# Patient Record
Sex: MALE | Race: WHITE | NOT HISPANIC OR LATINO | URBAN - METROPOLITAN AREA
[De-identification: names, ages, dates, MRNs, and addresses within clinical notes are randomized per-mention and may not be internally consistent; named-entity substitution may affect disease eponyms.]

---

## 2017-01-31 ENCOUNTER — COMMUNICATION - HEALTHEAST (OUTPATIENT)
Dept: CARDIOLOGY | Facility: CLINIC | Age: 77
End: 2017-01-31

## 2017-01-31 DIAGNOSIS — E78.5 HYPERLIPEMIA: ICD-10-CM

## 2017-02-07 ENCOUNTER — RECORDS - HEALTHEAST (OUTPATIENT)
Dept: LAB | Facility: CLINIC | Age: 77
End: 2017-02-07

## 2017-02-07 LAB
CHOLEST SERPL-MCNC: 159 MG/DL
FASTING STATUS PATIENT QL REPORTED: ABNORMAL
HDLC SERPL-MCNC: 55 MG/DL
LDLC SERPL CALC-MCNC: 73 MG/DL
PSA SERPL-MCNC: 3.8 NG/ML (ref 0–6.5)
TRIGL SERPL-MCNC: 154 MG/DL

## 2017-07-17 ENCOUNTER — COMMUNICATION - HEALTHEAST (OUTPATIENT)
Dept: CARDIOLOGY | Facility: CLINIC | Age: 77
End: 2017-07-17

## 2017-07-18 ENCOUNTER — AMBULATORY - HEALTHEAST (OUTPATIENT)
Dept: CARDIOLOGY | Facility: CLINIC | Age: 77
End: 2017-07-18

## 2017-07-18 DIAGNOSIS — I25.10 CAD (CORONARY ARTERY DISEASE): ICD-10-CM

## 2017-07-18 DIAGNOSIS — I10 HTN (HYPERTENSION): ICD-10-CM

## 2017-08-02 ENCOUNTER — COMMUNICATION - HEALTHEAST (OUTPATIENT)
Dept: TELEHEALTH | Facility: CLINIC | Age: 77
End: 2017-08-02

## 2017-08-02 ENCOUNTER — HOSPITAL ENCOUNTER (OUTPATIENT)
Dept: CARDIOLOGY | Facility: CLINIC | Age: 77
Discharge: HOME | End: 2017-08-02
Attending: INTERNAL MEDICINE

## 2017-08-02 ENCOUNTER — HOSPITAL ENCOUNTER (OUTPATIENT)
Dept: NUCLEAR MEDICINE | Facility: CLINIC | Age: 77
Discharge: HOME | End: 2017-08-02
Attending: INTERNAL MEDICINE

## 2017-08-02 DIAGNOSIS — I25.10 CAD (CORONARY ARTERY DISEASE): ICD-10-CM

## 2017-08-02 DIAGNOSIS — I10 HTN (HYPERTENSION): ICD-10-CM

## 2017-08-02 LAB
CV STRESS CURRENT BP HE: NORMAL
CV STRESS CURRENT HR HE: 62
CV STRESS CURRENT HR HE: 65
CV STRESS CURRENT HR HE: 73
CV STRESS CURRENT HR HE: 79
CV STRESS CURRENT HR HE: 81
CV STRESS CURRENT HR HE: 81
CV STRESS CURRENT HR HE: 82
CV STRESS CURRENT HR HE: 83
CV STRESS CURRENT HR HE: 84
CV STRESS CURRENT HR HE: 85
CV STRESS CURRENT HR HE: 86
CV STRESS CURRENT HR HE: 92
CV STRESS CURRENT HR HE: 92
CV STRESS CURRENT HR HE: 94
CV STRESS CURRENT HR HE: 94
CV STRESS CURRENT HR HE: 96
CV STRESS DEVIATION TIME HE: NORMAL
CV STRESS ECHO PERCENT HR HE: NORMAL
CV STRESS EXERCISE STAGE HE: NORMAL
CV STRESS FINAL RESTING BP HE: NORMAL
CV STRESS FINAL RESTING HR HE: 81
CV STRESS MAX HR HE: 100
CV STRESS MAX TREADMILL GRADE HE: 0
CV STRESS MAX TREADMILL SPEED HE: 0
CV STRESS PEAK DIA BP HE: NORMAL
CV STRESS PEAK SYS BP HE: NORMAL
CV STRESS PHASE HE: NORMAL
CV STRESS PROTOCOL HE: NORMAL
CV STRESS RESTING PT POSITION HE: NORMAL
CV STRESS ST DEVIATION AMOUNT HE: NORMAL
CV STRESS ST DEVIATION ELEVATION HE: NORMAL
CV STRESS ST EVELATION AMOUNT HE: NORMAL
CV STRESS TEST TYPE HE: NORMAL
CV STRESS TOTAL STAGE TIME MIN 1 HE: NORMAL
NUC STRESS EJECTION FRACTION: 74 %
STRESS ECHO BASELINE BP: NORMAL
STRESS ECHO BASELINE HR: 63
STRESS ECHO CALCULATED PERCENT HR: 70 %
STRESS ECHO LAST STRESS BP: NORMAL
STRESS ECHO LAST STRESS HR: 92

## 2017-08-08 ENCOUNTER — RECORDS - HEALTHEAST (OUTPATIENT)
Dept: LAB | Facility: CLINIC | Age: 77
End: 2017-08-08

## 2017-08-08 LAB
CREAT SERPL-MCNC: 1.18 MG/DL (ref 0.7–1.3)
GFR SERPL CREATININE-BSD FRML MDRD: 60 ML/MIN/1.73M2

## 2017-08-11 ASSESSMENT — MIFFLIN-ST. JEOR: SCORE: 1536.33

## 2017-08-16 ENCOUNTER — SURGERY - HEALTHEAST (OUTPATIENT)
Dept: SURGERY | Facility: CLINIC | Age: 77
End: 2017-08-16

## 2017-08-16 ENCOUNTER — ANESTHESIA - HEALTHEAST (OUTPATIENT)
Dept: SURGERY | Facility: CLINIC | Age: 77
End: 2017-08-16

## 2017-08-16 ASSESSMENT — MIFFLIN-ST. JEOR
SCORE: 1539.51

## 2017-08-17 ENCOUNTER — HOME CARE/HOSPICE - HEALTHEAST (OUTPATIENT)
Dept: HOME HEALTH SERVICES | Facility: HOME HEALTH | Age: 77
End: 2017-08-17

## 2017-08-20 ENCOUNTER — HOME CARE/HOSPICE - HEALTHEAST (OUTPATIENT)
Dept: HOME HEALTH SERVICES | Facility: HOME HEALTH | Age: 77
End: 2017-08-20

## 2017-08-21 ENCOUNTER — HOME CARE/HOSPICE - HEALTHEAST (OUTPATIENT)
Dept: HOME HEALTH SERVICES | Facility: HOME HEALTH | Age: 77
End: 2017-08-21

## 2017-08-22 ENCOUNTER — HOME CARE/HOSPICE - HEALTHEAST (OUTPATIENT)
Dept: HOME HEALTH SERVICES | Facility: HOME HEALTH | Age: 77
End: 2017-08-22

## 2017-08-23 ENCOUNTER — HOME CARE/HOSPICE - HEALTHEAST (OUTPATIENT)
Dept: HOME HEALTH SERVICES | Facility: HOME HEALTH | Age: 77
End: 2017-08-23

## 2017-08-25 ENCOUNTER — HOME CARE/HOSPICE - HEALTHEAST (OUTPATIENT)
Dept: HOME HEALTH SERVICES | Facility: HOME HEALTH | Age: 77
End: 2017-08-25

## 2017-08-28 ENCOUNTER — HOME CARE/HOSPICE - HEALTHEAST (OUTPATIENT)
Dept: HOME HEALTH SERVICES | Facility: HOME HEALTH | Age: 77
End: 2017-08-28

## 2017-08-30 ENCOUNTER — HOME CARE/HOSPICE - HEALTHEAST (OUTPATIENT)
Dept: HOME HEALTH SERVICES | Facility: HOME HEALTH | Age: 77
End: 2017-08-30

## 2017-08-31 ENCOUNTER — HOME CARE/HOSPICE - HEALTHEAST (OUTPATIENT)
Dept: HOME HEALTH SERVICES | Facility: HOME HEALTH | Age: 77
End: 2017-08-31

## 2017-09-01 ENCOUNTER — HOME CARE/HOSPICE - HEALTHEAST (OUTPATIENT)
Dept: HOME HEALTH SERVICES | Facility: HOME HEALTH | Age: 77
End: 2017-09-01

## 2017-12-27 ENCOUNTER — RECORDS - HEALTHEAST (OUTPATIENT)
Dept: LAB | Facility: CLINIC | Age: 77
End: 2017-12-27

## 2017-12-27 LAB
CREAT SERPL-MCNC: 0.9 MG/DL (ref 0.7–1.3)
GFR SERPL CREATININE-BSD FRML MDRD: >60 ML/MIN/1.73M2

## 2018-02-09 ENCOUNTER — RECORDS - HEALTHEAST (OUTPATIENT)
Dept: LAB | Facility: CLINIC | Age: 78
End: 2018-02-09

## 2018-02-09 LAB
ANION GAP SERPL CALCULATED.3IONS-SCNC: 12 MMOL/L (ref 5–18)
BUN SERPL-MCNC: 16 MG/DL (ref 8–28)
CALCIUM SERPL-MCNC: 9.2 MG/DL (ref 8.5–10.5)
CHLORIDE BLD-SCNC: 100 MMOL/L (ref 98–107)
CO2 SERPL-SCNC: 25 MMOL/L (ref 22–31)
CREAT SERPL-MCNC: 1.01 MG/DL (ref 0.7–1.3)
GFR SERPL CREATININE-BSD FRML MDRD: >60 ML/MIN/1.73M2
GLUCOSE BLD-MCNC: 188 MG/DL (ref 70–125)
POTASSIUM BLD-SCNC: 3.7 MMOL/L (ref 3.5–5)
SODIUM SERPL-SCNC: 137 MMOL/L (ref 136–145)

## 2018-02-10 LAB — BACTERIA SPEC CULT: NO GROWTH

## 2018-02-15 ASSESSMENT — MIFFLIN-ST. JEOR: SCORE: 1536.33

## 2018-02-16 ENCOUNTER — RECORDS - HEALTHEAST (OUTPATIENT)
Dept: LAB | Facility: CLINIC | Age: 78
End: 2018-02-16

## 2018-02-16 LAB
CREAT SERPL-MCNC: 1.11 MG/DL (ref 0.7–1.3)
GFR SERPL CREATININE-BSD FRML MDRD: >60 ML/MIN/1.73M2
POTASSIUM BLD-SCNC: 4.6 MMOL/L (ref 3.5–5)

## 2018-02-20 ENCOUNTER — ANESTHESIA - HEALTHEAST (OUTPATIENT)
Dept: SURGERY | Facility: CLINIC | Age: 78
End: 2018-02-20

## 2018-02-21 ENCOUNTER — SURGERY - HEALTHEAST (OUTPATIENT)
Dept: SURGERY | Facility: CLINIC | Age: 78
End: 2018-02-21

## 2018-02-21 ENCOUNTER — HOME CARE/HOSPICE - HEALTHEAST (OUTPATIENT)
Dept: HOME HEALTH SERVICES | Facility: HOME HEALTH | Age: 78
End: 2018-02-21

## 2018-02-21 ASSESSMENT — MIFFLIN-ST. JEOR: SCORE: 1504.58

## 2018-02-25 ENCOUNTER — HOME CARE/HOSPICE - HEALTHEAST (OUTPATIENT)
Dept: HOME HEALTH SERVICES | Facility: HOME HEALTH | Age: 78
End: 2018-02-25

## 2018-02-27 ENCOUNTER — HOME CARE/HOSPICE - HEALTHEAST (OUTPATIENT)
Dept: HOME HEALTH SERVICES | Facility: HOME HEALTH | Age: 78
End: 2018-02-27

## 2018-02-28 ENCOUNTER — HOME CARE/HOSPICE - HEALTHEAST (OUTPATIENT)
Dept: HOME HEALTH SERVICES | Facility: HOME HEALTH | Age: 78
End: 2018-02-28

## 2018-03-02 ENCOUNTER — COMMUNICATION - HEALTHEAST (OUTPATIENT)
Dept: CARDIOLOGY | Facility: CLINIC | Age: 78
End: 2018-03-02

## 2018-03-02 ENCOUNTER — HOME CARE/HOSPICE - HEALTHEAST (OUTPATIENT)
Dept: HOME HEALTH SERVICES | Facility: HOME HEALTH | Age: 78
End: 2018-03-02

## 2018-03-02 DIAGNOSIS — E78.5 HYPERLIPEMIA: ICD-10-CM

## 2018-03-06 ENCOUNTER — HOME CARE/HOSPICE - HEALTHEAST (OUTPATIENT)
Dept: HOME HEALTH SERVICES | Facility: HOME HEALTH | Age: 78
End: 2018-03-06

## 2018-03-07 ENCOUNTER — HOME CARE/HOSPICE - HEALTHEAST (OUTPATIENT)
Dept: HOME HEALTH SERVICES | Facility: HOME HEALTH | Age: 78
End: 2018-03-07

## 2018-03-09 ENCOUNTER — HOME CARE/HOSPICE - HEALTHEAST (OUTPATIENT)
Dept: HOME HEALTH SERVICES | Facility: HOME HEALTH | Age: 78
End: 2018-03-09

## 2018-04-07 ENCOUNTER — COMMUNICATION - HEALTHEAST (OUTPATIENT)
Dept: CARDIOLOGY | Facility: CLINIC | Age: 78
End: 2018-04-07

## 2018-04-07 DIAGNOSIS — E78.5 HYPERLIPEMIA: ICD-10-CM

## 2018-04-19 ENCOUNTER — RECORDS - HEALTHEAST (OUTPATIENT)
Dept: ADMINISTRATIVE | Facility: OTHER | Age: 78
End: 2018-04-19

## 2018-04-19 ENCOUNTER — AMBULATORY - HEALTHEAST (OUTPATIENT)
Dept: CARDIOLOGY | Facility: CLINIC | Age: 78
End: 2018-04-19

## 2018-04-20 ENCOUNTER — OFFICE VISIT - HEALTHEAST (OUTPATIENT)
Dept: CARDIOLOGY | Facility: CLINIC | Age: 78
End: 2018-04-20

## 2018-04-20 DIAGNOSIS — I25.83 CORONARY ARTERY DISEASE DUE TO LIPID RICH PLAQUE: ICD-10-CM

## 2018-04-20 DIAGNOSIS — I25.10 CORONARY ARTERY DISEASE DUE TO LIPID RICH PLAQUE: ICD-10-CM

## 2018-04-20 DIAGNOSIS — I10 ESSENTIAL HYPERTENSION: ICD-10-CM

## 2018-04-20 DIAGNOSIS — E78.49 OTHER HYPERLIPIDEMIA: ICD-10-CM

## 2018-04-20 ASSESSMENT — MIFFLIN-ST. JEOR: SCORE: 1522.73

## 2018-05-07 ENCOUNTER — HOSPITAL ENCOUNTER (OUTPATIENT)
Dept: CARDIOLOGY | Facility: CLINIC | Age: 78
Discharge: HOME | End: 2018-05-07
Attending: INTERNAL MEDICINE

## 2018-05-07 ENCOUNTER — HOSPITAL ENCOUNTER (OUTPATIENT)
Dept: NUCLEAR MEDICINE | Facility: CLINIC | Age: 78
Discharge: HOME | End: 2018-05-07
Attending: INTERNAL MEDICINE

## 2018-05-07 DIAGNOSIS — I25.10 CORONARY ARTERY DISEASE DUE TO LIPID RICH PLAQUE: ICD-10-CM

## 2018-05-07 DIAGNOSIS — I25.83 CORONARY ARTERY DISEASE DUE TO LIPID RICH PLAQUE: ICD-10-CM

## 2018-05-07 LAB
CV STRESS CURRENT BP HE: NORMAL
CV STRESS CURRENT HR HE: 100
CV STRESS CURRENT HR HE: 102
CV STRESS CURRENT HR HE: 103
CV STRESS CURRENT HR HE: 103
CV STRESS CURRENT HR HE: 105
CV STRESS CURRENT HR HE: 109
CV STRESS CURRENT HR HE: 110
CV STRESS CURRENT HR HE: 112
CV STRESS CURRENT HR HE: 113
CV STRESS CURRENT HR HE: 113
CV STRESS CURRENT HR HE: 114
CV STRESS CURRENT HR HE: 115
CV STRESS CURRENT HR HE: 115
CV STRESS CURRENT HR HE: 122
CV STRESS CURRENT HR HE: 124
CV STRESS CURRENT HR HE: 125
CV STRESS CURRENT HR HE: 128
CV STRESS CURRENT HR HE: 130
CV STRESS CURRENT HR HE: 130
CV STRESS CURRENT HR HE: 132
CV STRESS CURRENT HR HE: 134
CV STRESS CURRENT HR HE: 91
CV STRESS CURRENT HR HE: 94
CV STRESS CURRENT HR HE: 97
CV STRESS CURRENT HR HE: 97
CV STRESS CURRENT HR HE: 98
CV STRESS CURRENT HR HE: 99
CV STRESS DEVIATION TIME HE: NORMAL
CV STRESS ECHO PERCENT HR HE: NORMAL
CV STRESS EXERCISE STAGE HE: NORMAL
CV STRESS EXERCISE STAGE REACHED HE: NORMAL
CV STRESS FINAL RESTING BP HE: NORMAL
CV STRESS FINAL RESTING HR HE: 94
CV STRESS MAX HR HE: 139
CV STRESS MAX TREADMILL GRADE HE: 14
CV STRESS MAX TREADMILL SPEED HE: 3.4
CV STRESS PEAK DIA BP HE: NORMAL
CV STRESS PEAK SYS BP HE: NORMAL
CV STRESS PHASE HE: NORMAL
CV STRESS PROTOCOL HE: NORMAL
CV STRESS RESTING PT POSITION HE: NORMAL
CV STRESS RESTING PT POSITION HE: NORMAL
CV STRESS ST DEVIATION AMOUNT HE: NORMAL
CV STRESS ST DEVIATION ELEVATION HE: NORMAL
CV STRESS ST EVELATION AMOUNT HE: NORMAL
CV STRESS TEST TYPE HE: NORMAL
CV STRESS TOTAL STAGE TIME MIN 1 HE: NORMAL
NUC STRESS EJECTION FRACTION: NORMAL %
STRESS ECHO BASELINE BP: NORMAL
STRESS ECHO BASELINE HR: 99
STRESS ECHO CALCULATED PERCENT HR: 99 %
STRESS ECHO LAST STRESS BP: NORMAL
STRESS ECHO LAST STRESS HR: 130
STRESS ECHO POST ESTIMATED WORKLOAD: 8.7
STRESS ECHO POST EXERCISE DUR MIN: 7
STRESS ECHO POST EXERCISE DUR SEC: 12
STRESS ECHO TARGET HR: 120

## 2018-05-08 ENCOUNTER — COMMUNICATION - HEALTHEAST (OUTPATIENT)
Dept: CARDIOLOGY | Facility: CLINIC | Age: 78
End: 2018-05-08

## 2018-05-08 DIAGNOSIS — E78.5 HYPERLIPEMIA: ICD-10-CM

## 2018-05-15 ENCOUNTER — COMMUNICATION - HEALTHEAST (OUTPATIENT)
Dept: CARDIOLOGY | Facility: CLINIC | Age: 78
End: 2018-05-15

## 2018-07-02 ENCOUNTER — RECORDS - HEALTHEAST (OUTPATIENT)
Dept: LAB | Facility: CLINIC | Age: 78
End: 2018-07-02

## 2018-07-02 LAB
C REACTIVE PROTEIN LHE: 0.1 MG/DL (ref 0–0.8)
CREAT SERPL-MCNC: 0.95 MG/DL (ref 0.7–1.3)
GFR SERPL CREATININE-BSD FRML MDRD: >60 ML/MIN/1.73M2
POTASSIUM BLD-SCNC: 3.8 MMOL/L (ref 3.5–5)

## 2018-07-03 LAB — B BURGDOR IGG+IGM SER QL: 0.19 INDEX VALUE

## 2018-07-05 LAB
A PHAGOCYTOPH DNA BLD QL NAA+PROBE: NOT DETECTED
E CHAFFEENSIS DNA BLD QL NAA+PROBE: NOT DETECTED
E EWINGII DNA SPEC QL NAA+PROBE: NOT DETECTED
EHRLICHIA DNA SPEC QL NAA+PROBE: NOT DETECTED

## 2018-10-25 ENCOUNTER — HOME CARE/HOSPICE - HEALTHEAST (OUTPATIENT)
Dept: HOME HEALTH SERVICES | Facility: HOME HEALTH | Age: 78
End: 2018-10-25

## 2018-10-25 ENCOUNTER — ANESTHESIA - HEALTHEAST (OUTPATIENT)
Dept: INTENSIVE CARE | Facility: CLINIC | Age: 78
End: 2018-10-25

## 2018-10-31 ENCOUNTER — AMBULATORY - HEALTHEAST (OUTPATIENT)
Dept: INTENSIVE CARE | Facility: CLINIC | Age: 78
End: 2018-10-31

## 2018-11-02 ENCOUNTER — HOME CARE/HOSPICE - HEALTHEAST (OUTPATIENT)
Dept: HOME HEALTH SERVICES | Facility: HOME HEALTH | Age: 78
End: 2018-11-02

## 2018-11-04 ENCOUNTER — HOME CARE/HOSPICE - HEALTHEAST (OUTPATIENT)
Dept: HOME HEALTH SERVICES | Facility: HOME HEALTH | Age: 78
End: 2018-11-04

## 2018-11-07 ENCOUNTER — HOME CARE/HOSPICE - HEALTHEAST (OUTPATIENT)
Dept: HOME HEALTH SERVICES | Facility: HOME HEALTH | Age: 78
End: 2018-11-07

## 2018-11-10 ENCOUNTER — HOME CARE/HOSPICE - HEALTHEAST (OUTPATIENT)
Dept: HOME HEALTH SERVICES | Facility: HOME HEALTH | Age: 78
End: 2018-11-10

## 2018-11-12 ENCOUNTER — HOME CARE/HOSPICE - HEALTHEAST (OUTPATIENT)
Dept: HOME HEALTH SERVICES | Facility: HOME HEALTH | Age: 78
End: 2018-11-12

## 2018-11-13 ENCOUNTER — HOME CARE/HOSPICE - HEALTHEAST (OUTPATIENT)
Dept: HOME HEALTH SERVICES | Facility: HOME HEALTH | Age: 78
End: 2018-11-13

## 2018-11-16 ENCOUNTER — HOME CARE/HOSPICE - HEALTHEAST (OUTPATIENT)
Dept: HOME HEALTH SERVICES | Facility: HOME HEALTH | Age: 78
End: 2018-11-16

## 2019-02-08 ENCOUNTER — RECORDS - HEALTHEAST (OUTPATIENT)
Dept: ADMINISTRATIVE | Facility: OTHER | Age: 79
End: 2019-02-08

## 2019-04-02 ENCOUNTER — RECORDS - HEALTHEAST (OUTPATIENT)
Dept: LAB | Facility: CLINIC | Age: 79
End: 2019-04-02

## 2019-04-02 LAB
ANION GAP SERPL CALCULATED.3IONS-SCNC: 14 MMOL/L (ref 5–18)
BUN SERPL-MCNC: 19 MG/DL (ref 8–28)
CALCIUM SERPL-MCNC: 10.4 MG/DL (ref 8.5–10.5)
CHLORIDE BLD-SCNC: 103 MMOL/L (ref 98–107)
CHOLEST SERPL-MCNC: 151 MG/DL
CO2 SERPL-SCNC: 21 MMOL/L (ref 22–31)
CREAT SERPL-MCNC: 1 MG/DL (ref 0.7–1.3)
FASTING STATUS PATIENT QL REPORTED: NO
GFR SERPL CREATININE-BSD FRML MDRD: >60 ML/MIN/1.73M2
GLUCOSE BLD-MCNC: 320 MG/DL (ref 70–125)
HDLC SERPL-MCNC: 58 MG/DL
LDLC SERPL CALC-MCNC: 69 MG/DL
POTASSIUM BLD-SCNC: 4.3 MMOL/L (ref 3.5–5)
SODIUM SERPL-SCNC: 138 MMOL/L (ref 136–145)
TRIGL SERPL-MCNC: 121 MG/DL

## 2019-05-17 ENCOUNTER — RECORDS - HEALTHEAST (OUTPATIENT)
Dept: ADMINISTRATIVE | Facility: OTHER | Age: 79
End: 2019-05-17

## 2019-05-17 ENCOUNTER — COMMUNICATION - HEALTHEAST (OUTPATIENT)
Dept: CARDIOLOGY | Facility: CLINIC | Age: 79
End: 2019-05-17

## 2019-05-17 DIAGNOSIS — I25.10 CORONARY ARTERY DISEASE DUE TO LIPID RICH PLAQUE: ICD-10-CM

## 2019-05-17 DIAGNOSIS — I25.83 CORONARY ARTERY DISEASE DUE TO LIPID RICH PLAQUE: ICD-10-CM

## 2019-05-17 ASSESSMENT — MIFFLIN-ST. JEOR: SCORE: 1513.65

## 2019-05-18 ENCOUNTER — SURGERY - HEALTHEAST (OUTPATIENT)
Dept: SURGERY | Facility: CLINIC | Age: 79
End: 2019-05-18

## 2019-05-18 ENCOUNTER — ANESTHESIA - HEALTHEAST (OUTPATIENT)
Dept: SURGERY | Facility: CLINIC | Age: 79
End: 2019-05-18

## 2019-05-18 ASSESSMENT — MIFFLIN-ST. JEOR: SCORE: 1535.88

## 2019-05-19 ASSESSMENT — MIFFLIN-ST. JEOR: SCORE: 1533.61

## 2019-06-06 ENCOUNTER — OFFICE VISIT - HEALTHEAST (OUTPATIENT)
Dept: CARDIOLOGY | Facility: CLINIC | Age: 79
End: 2019-06-06

## 2019-06-06 DIAGNOSIS — I10 ESSENTIAL HYPERTENSION: ICD-10-CM

## 2019-06-06 DIAGNOSIS — I25.83 CORONARY ARTERY DISEASE DUE TO LIPID RICH PLAQUE: ICD-10-CM

## 2019-06-06 DIAGNOSIS — E78.5 DYSLIPIDEMIA, GOAL LDL BELOW 70: ICD-10-CM

## 2019-06-06 DIAGNOSIS — E11.9 TYPE 2 DIABETES MELLITUS WITHOUT COMPLICATION, WITHOUT LONG-TERM CURRENT USE OF INSULIN (H): ICD-10-CM

## 2019-06-06 DIAGNOSIS — I25.10 CORONARY ARTERY DISEASE DUE TO LIPID RICH PLAQUE: ICD-10-CM

## 2019-06-06 ASSESSMENT — MIFFLIN-ST. JEOR: SCORE: 1514.11

## 2019-06-14 ENCOUNTER — HOSPITAL ENCOUNTER (OUTPATIENT)
Dept: NUCLEAR MEDICINE | Facility: CLINIC | Age: 79
Discharge: HOME | End: 2019-06-14
Attending: INTERNAL MEDICINE

## 2019-06-14 ENCOUNTER — HOSPITAL ENCOUNTER (OUTPATIENT)
Dept: CARDIOLOGY | Facility: CLINIC | Age: 79
Discharge: HOME | End: 2019-06-14
Attending: INTERNAL MEDICINE

## 2019-06-14 DIAGNOSIS — I25.10 CORONARY ARTERY DISEASE DUE TO LIPID RICH PLAQUE: ICD-10-CM

## 2019-06-14 DIAGNOSIS — I25.83 CORONARY ARTERY DISEASE DUE TO LIPID RICH PLAQUE: ICD-10-CM

## 2019-06-14 LAB
CV STRESS CURRENT BP HE: NORMAL
CV STRESS CURRENT HR HE: 102
CV STRESS CURRENT HR HE: 103
CV STRESS CURRENT HR HE: 110
CV STRESS CURRENT HR HE: 112
CV STRESS CURRENT HR HE: 113
CV STRESS CURRENT HR HE: 114
CV STRESS CURRENT HR HE: 118
CV STRESS CURRENT HR HE: 119
CV STRESS CURRENT HR HE: 119
CV STRESS CURRENT HR HE: 120
CV STRESS CURRENT HR HE: 121
CV STRESS CURRENT HR HE: 122
CV STRESS CURRENT HR HE: 73
CV STRESS CURRENT HR HE: 79
CV STRESS CURRENT HR HE: 84
CV STRESS CURRENT HR HE: 85
CV STRESS CURRENT HR HE: 86
CV STRESS CURRENT HR HE: 88
CV STRESS CURRENT HR HE: 90
CV STRESS CURRENT HR HE: 91
CV STRESS CURRENT HR HE: 92
CV STRESS CURRENT HR HE: 93
CV STRESS CURRENT HR HE: 94
CV STRESS CURRENT HR HE: 98
CV STRESS CURRENT HR HE: 99
CV STRESS DEVIATION TIME HE: NORMAL
CV STRESS ECHO PERCENT HR HE: NORMAL
CV STRESS EXERCISE STAGE HE: NORMAL
CV STRESS EXERCISE STAGE REACHED HE: NORMAL
CV STRESS FINAL RESTING BP HE: NORMAL
CV STRESS FINAL RESTING HR HE: 85
CV STRESS MAX HR HE: 122
CV STRESS MAX TREADMILL GRADE HE: 14
CV STRESS MAX TREADMILL SPEED HE: 3.4
CV STRESS PEAK DIA BP HE: NORMAL
CV STRESS PEAK SYS BP HE: NORMAL
CV STRESS PHASE HE: NORMAL
CV STRESS PROTOCOL HE: NORMAL
CV STRESS RESTING PT POSITION HE: NORMAL
CV STRESS ST DEVIATION AMOUNT HE: NORMAL
CV STRESS ST DEVIATION ELEVATION HE: NORMAL
CV STRESS ST EVELATION AMOUNT HE: NORMAL
CV STRESS TEST TYPE HE: NORMAL
CV STRESS TOTAL STAGE TIME MIN 1 HE: NORMAL
NUC STRESS EJECTION FRACTION: 71 %
STRESS ECHO BASELINE BP: NORMAL
STRESS ECHO BASELINE HR: 77
STRESS ECHO CALCULATED PERCENT HR: 87 %
STRESS ECHO LAST STRESS BP: NORMAL
STRESS ECHO LAST STRESS HR: 122
STRESS ECHO POST ESTIMATED WORKLOAD: 9.7
STRESS ECHO POST EXERCISE DUR MIN: 8
STRESS ECHO POST EXERCISE DUR SEC: 5
STRESS ECHO TARGET HR: 119

## 2019-08-14 ENCOUNTER — RECORDS - HEALTHEAST (OUTPATIENT)
Dept: LAB | Facility: CLINIC | Age: 79
End: 2019-08-14

## 2019-08-14 LAB
ANION GAP SERPL CALCULATED.3IONS-SCNC: 10 MMOL/L (ref 5–18)
BUN SERPL-MCNC: 17 MG/DL (ref 8–28)
CALCIUM SERPL-MCNC: 9.6 MG/DL (ref 8.5–10.5)
CHLORIDE BLD-SCNC: 102 MMOL/L (ref 98–107)
CO2 SERPL-SCNC: 25 MMOL/L (ref 22–31)
CREAT SERPL-MCNC: 0.91 MG/DL (ref 0.7–1.3)
GFR SERPL CREATININE-BSD FRML MDRD: >60 ML/MIN/1.73M2
GLUCOSE BLD-MCNC: 202 MG/DL (ref 70–125)
POTASSIUM BLD-SCNC: 3.9 MMOL/L (ref 3.5–5)
SODIUM SERPL-SCNC: 137 MMOL/L (ref 136–145)

## 2019-08-15 LAB — B BURGDOR IGG+IGM SER QL: 0.23 INDEX VALUE

## 2019-11-26 ENCOUNTER — RECORDS - HEALTHEAST (OUTPATIENT)
Dept: LAB | Facility: CLINIC | Age: 79
End: 2019-11-26

## 2019-11-26 LAB
ALBUMIN SERPL-MCNC: 3.4 G/DL (ref 3.5–5)
ALP SERPL-CCNC: 77 U/L (ref 45–120)
ALT SERPL W P-5'-P-CCNC: 22 U/L (ref 0–45)
ANION GAP SERPL CALCULATED.3IONS-SCNC: 15 MMOL/L (ref 5–18)
AST SERPL W P-5'-P-CCNC: 31 U/L (ref 0–40)
BILIRUB SERPL-MCNC: 2.1 MG/DL (ref 0–1)
BUN SERPL-MCNC: 34 MG/DL (ref 8–28)
CALCIUM SERPL-MCNC: 9 MG/DL (ref 8.5–10.5)
CHLORIDE BLD-SCNC: 99 MMOL/L (ref 98–107)
CO2 SERPL-SCNC: 21 MMOL/L (ref 22–31)
CREAT SERPL-MCNC: 1.53 MG/DL (ref 0.7–1.3)
GFR SERPL CREATININE-BSD FRML MDRD: 44 ML/MIN/1.73M2
GLUCOSE BLD-MCNC: 210 MG/DL (ref 70–125)
POTASSIUM BLD-SCNC: 4.2 MMOL/L (ref 3.5–5)
PROT SERPL-MCNC: 6.8 G/DL (ref 6–8)
SODIUM SERPL-SCNC: 135 MMOL/L (ref 136–145)

## 2020-03-09 ENCOUNTER — RECORDS - HEALTHEAST (OUTPATIENT)
Dept: LAB | Facility: CLINIC | Age: 80
End: 2020-03-09

## 2020-03-09 LAB
ANION GAP SERPL CALCULATED.3IONS-SCNC: 11 MMOL/L (ref 5–18)
BUN SERPL-MCNC: 19 MG/DL (ref 8–28)
CALCIUM SERPL-MCNC: 9.3 MG/DL (ref 8.5–10.5)
CHLORIDE BLD-SCNC: 104 MMOL/L (ref 98–107)
CHOLEST SERPL-MCNC: 169 MG/DL
CO2 SERPL-SCNC: 24 MMOL/L (ref 22–31)
CREAT SERPL-MCNC: 0.97 MG/DL (ref 0.7–1.3)
FASTING STATUS PATIENT QL REPORTED: NO
GFR SERPL CREATININE-BSD FRML MDRD: >60 ML/MIN/1.73M2
GLUCOSE BLD-MCNC: 200 MG/DL (ref 70–125)
HDLC SERPL-MCNC: 66 MG/DL
LDLC SERPL CALC-MCNC: 87 MG/DL
POTASSIUM BLD-SCNC: 4.1 MMOL/L (ref 3.5–5)
SODIUM SERPL-SCNC: 139 MMOL/L (ref 136–145)
TRIGL SERPL-MCNC: 80 MG/DL

## 2020-05-15 ENCOUNTER — RECORDS - HEALTHEAST (OUTPATIENT)
Dept: LAB | Facility: CLINIC | Age: 80
End: 2020-05-15

## 2020-05-15 LAB
ALBUMIN SERPL-MCNC: 4.1 G/DL (ref 3.5–5)
ALP SERPL-CCNC: 96 U/L (ref 45–120)
ALT SERPL W P-5'-P-CCNC: 28 U/L (ref 0–45)
ANION GAP SERPL CALCULATED.3IONS-SCNC: 10 MMOL/L (ref 5–18)
AST SERPL W P-5'-P-CCNC: 27 U/L (ref 0–40)
BILIRUB SERPL-MCNC: 0.6 MG/DL (ref 0–1)
BNP SERPL-MCNC: 72 PG/ML (ref 0–88)
BUN SERPL-MCNC: 15 MG/DL (ref 8–28)
CALCIUM SERPL-MCNC: 9.5 MG/DL (ref 8.5–10.5)
CHLORIDE BLD-SCNC: 103 MMOL/L (ref 98–107)
CO2 SERPL-SCNC: 26 MMOL/L (ref 22–31)
CREAT SERPL-MCNC: 0.89 MG/DL (ref 0.7–1.3)
CREAT UR-MCNC: 79 MG/DL
GFR SERPL CREATININE-BSD FRML MDRD: >60 ML/MIN/1.73M2
GLUCOSE BLD-MCNC: 144 MG/DL (ref 70–125)
MICROALBUMIN UR-MCNC: 6.13 MG/DL (ref 0–1.99)
MICROALBUMIN/CREAT UR: 77.6 MG/G
POTASSIUM BLD-SCNC: 4.2 MMOL/L (ref 3.5–5)
PROT SERPL-MCNC: 7 G/DL (ref 6–8)
SODIUM SERPL-SCNC: 139 MMOL/L (ref 136–145)
TSH SERPL DL<=0.005 MIU/L-ACNC: 1.63 UIU/ML (ref 0.3–5)

## 2020-06-19 ENCOUNTER — RECORDS - HEALTHEAST (OUTPATIENT)
Dept: LAB | Facility: CLINIC | Age: 80
End: 2020-06-19

## 2020-06-19 LAB
ANION GAP SERPL CALCULATED.3IONS-SCNC: 8 MMOL/L (ref 5–18)
BUN SERPL-MCNC: 17 MG/DL (ref 8–28)
CALCIUM SERPL-MCNC: 9.1 MG/DL (ref 8.5–10.5)
CHLORIDE BLD-SCNC: 103 MMOL/L (ref 98–107)
CO2 SERPL-SCNC: 28 MMOL/L (ref 22–31)
CREAT SERPL-MCNC: 0.9 MG/DL (ref 0.7–1.3)
GFR SERPL CREATININE-BSD FRML MDRD: >60 ML/MIN/1.73M2
GLUCOSE BLD-MCNC: 199 MG/DL (ref 70–125)
POTASSIUM BLD-SCNC: 4 MMOL/L (ref 3.5–5)
SODIUM SERPL-SCNC: 139 MMOL/L (ref 136–145)

## 2020-08-27 ENCOUNTER — COMMUNICATION - HEALTHEAST (OUTPATIENT)
Dept: CARDIOLOGY | Facility: CLINIC | Age: 80
End: 2020-08-27

## 2020-10-09 ENCOUNTER — AMBULATORY - HEALTHEAST (OUTPATIENT)
Dept: CARDIOLOGY | Facility: CLINIC | Age: 80
End: 2020-10-09

## 2020-10-09 ENCOUNTER — RECORDS - HEALTHEAST (OUTPATIENT)
Dept: ADMINISTRATIVE | Facility: OTHER | Age: 80
End: 2020-10-09

## 2020-10-13 ENCOUNTER — OFFICE VISIT - HEALTHEAST (OUTPATIENT)
Dept: CARDIOLOGY | Facility: CLINIC | Age: 80
End: 2020-10-13

## 2020-10-13 DIAGNOSIS — I25.10 CORONARY ARTERY DISEASE DUE TO LIPID RICH PLAQUE: ICD-10-CM

## 2020-10-13 DIAGNOSIS — I25.83 CORONARY ARTERY DISEASE DUE TO LIPID RICH PLAQUE: ICD-10-CM

## 2020-10-13 DIAGNOSIS — E78.5 DYSLIPIDEMIA, GOAL LDL BELOW 70: ICD-10-CM

## 2020-10-13 DIAGNOSIS — E11.9 TYPE 2 DIABETES MELLITUS WITHOUT COMPLICATION, WITHOUT LONG-TERM CURRENT USE OF INSULIN (H): ICD-10-CM

## 2020-10-13 DIAGNOSIS — I10 ESSENTIAL HYPERTENSION: ICD-10-CM

## 2020-10-13 RX ORDER — TELMISARTAN 40 MG/1
40 TABLET ORAL 2 TIMES DAILY
Status: SHIPPED | COMMUNITY
Start: 2020-09-25

## 2020-10-13 RX ORDER — HYDROCHLOROTHIAZIDE 25 MG/1
25 TABLET ORAL 2 TIMES DAILY
Status: SHIPPED | COMMUNITY
Start: 2020-08-31

## 2020-10-13 ASSESSMENT — MIFFLIN-ST. JEOR: SCORE: 1520.46

## 2020-10-14 ENCOUNTER — COMMUNICATION - HEALTHEAST (OUTPATIENT)
Dept: CARDIOLOGY | Facility: CLINIC | Age: 80
End: 2020-10-14

## 2020-10-16 ENCOUNTER — RECORDS - HEALTHEAST (OUTPATIENT)
Dept: LAB | Facility: CLINIC | Age: 80
End: 2020-10-16

## 2020-10-16 LAB
ALBUMIN SERPL-MCNC: 3.9 G/DL (ref 3.5–5)
ALP SERPL-CCNC: 82 U/L (ref 45–120)
ALT SERPL W P-5'-P-CCNC: 16 U/L (ref 0–45)
ANION GAP SERPL CALCULATED.3IONS-SCNC: 12 MMOL/L (ref 5–18)
AST SERPL W P-5'-P-CCNC: 13 U/L (ref 0–40)
BILIRUB SERPL-MCNC: 1.4 MG/DL (ref 0–1)
BUN SERPL-MCNC: 16 MG/DL (ref 8–28)
CALCIUM SERPL-MCNC: 9.2 MG/DL (ref 8.5–10.5)
CHLORIDE BLD-SCNC: 97 MMOL/L (ref 98–107)
CO2 SERPL-SCNC: 28 MMOL/L (ref 22–31)
CREAT SERPL-MCNC: 1.03 MG/DL (ref 0.7–1.3)
GFR SERPL CREATININE-BSD FRML MDRD: >60 ML/MIN/1.73M2
GLUCOSE BLD-MCNC: 184 MG/DL (ref 70–125)
POTASSIUM BLD-SCNC: 4 MMOL/L (ref 3.5–5)
PROT SERPL-MCNC: 6.7 G/DL (ref 6–8)
SODIUM SERPL-SCNC: 137 MMOL/L (ref 136–145)

## 2021-04-21 ENCOUNTER — RECORDS - HEALTHEAST (OUTPATIENT)
Dept: LAB | Facility: CLINIC | Age: 81
End: 2021-04-21

## 2021-04-21 LAB
CHOLEST SERPL-MCNC: 136 MG/DL
FASTING STATUS PATIENT QL REPORTED: NORMAL
HDLC SERPL-MCNC: 59 MG/DL
LDLC SERPL CALC-MCNC: 62 MG/DL
TRIGL SERPL-MCNC: 74 MG/DL

## 2021-05-26 ENCOUNTER — RECORDS - HEALTHEAST (OUTPATIENT)
Dept: ADMINISTRATIVE | Facility: CLINIC | Age: 81
End: 2021-05-26

## 2021-05-27 ENCOUNTER — RECORDS - HEALTHEAST (OUTPATIENT)
Dept: ADMINISTRATIVE | Facility: CLINIC | Age: 81
End: 2021-05-27

## 2021-05-28 NOTE — TELEPHONE ENCOUNTER
----- Message from Sonja Bautista sent at 5/17/2019 12:09 PM CDT -----  Contact: Dr. Reese Josue  General phone call:    Caller: Dr. Reese Josue  Primary cardiologist: Lenny  Detailed reason for call: Pt in clinic- new symptoms- chest pain- would like to speak to Dr. Galvez or staff- pt due to see Dr. Galvez 5/23  New or active symptoms?   Best phone number: [phone]  Best time to contact:   Ok to leave a detailed message?   Device?     Additional Info:

## 2021-05-28 NOTE — TELEPHONE ENCOUNTER
Returned call to pt's PCP, Reese Josue who wanted to share with Dr. Galvez an status update on pt who currently in his clinic.    Pt has two major complaints. First, he is experiencing extreme LLQ pain. For this reason he has ordered a CT to check for appendicitis.   Secondly, pt recently went to a concert where he experienced exertional CP and shortness of breath. Pt states pain goes away with rest. Dr. Reese Josue did an ECG which was negative for any acute changes. Pt does have appointment with F 5/23 however, he wanted to see if Dr. Galvez would like further testing beforehand.        Dr. Galvez, Pt's PCP, Dr. Josue called to inform you that pt experiences left sided CP and shortness of breath with activity. Symptoms are relieved with rest. Pt's PCP stated today's ECG was negative for acute changes. Pt has follow-up with you 5/23. Any new recommendations beforehand?      PCP direct line - [phone]

## 2021-05-28 NOTE — TELEPHONE ENCOUNTER
Noted that patient was in the hospital 5/17-5/18 with acute appendicitis and had his appendix removed.       Dr. Galvez,  Pt is s/p lap appy 5/18/19. I know you want a stress test- would you like him to wait a certain amount of time before this?  Thanks,  Mal

## 2021-05-28 NOTE — ANESTHESIA CARE TRANSFER NOTE
Last vitals:   Vitals:    05/18/19 1419   BP: 153/76   Pulse: 68   Resp: 18   Temp: 36.7  C (98.1  F)   SpO2: 99%     Patient's level of consciousness is awake  Spontaneous respirations: yes  Maintains airway independently: yes  Dentition unchanged: yes  Oropharynx: oropharynx clear of all foreign objects    QCDR Measures:  ASA# 20 - Surgical Safety Checklist: WHO surgical safety checklist completed prior to induction    PQRS# 430 - Adult PONV Prevention: 4558F - Pt received => 2 anti-emetic agents (different classes) preop & intraop  ASA# 8 - Peds PONV Prevention: NA - Not pediatric patient, not GA or 2 or more risk factors NOT present  PQRS# 424 - [place]-op Temp Management: 4559F - At least one body temp DOCUMENTED => 35.5C or 95.9F within required timeframe  PQRS# 426 - PACU Transfer Protocol: - Transfer of care checklist used  ASA# 14 - Acute Post-op Pain: ASA14B - Patient did NOT experience pain >= 7 out of 10

## 2021-05-28 NOTE — ANESTHESIA PREPROCEDURE EVALUATION
Anesthesia Evaluation      Patient summary reviewed   No history of anesthetic complications     Airway   Mallampati: III  Neck ROM: full   Pulmonary - normal exam    breath sounds clear to auscultation  (+) sleep apnea (and supplemental nocturnal oxygen) on CPAP, , a smoker (remote - quit at age 34)                         Cardiovascular - normal exam  Exercise tolerance: > or = 4 METS  (+) hypertension, past MI, CAD, CABG/stent (s/p stent 2012), , hypercholesterolemia,     ECG reviewed        Neuro/Psych - negative ROS     Endo/Other    (+) diabetes mellitus type 2 well controlled,      GI/Hepatic/Renal    (+) GERD well controlled and intermittent,       Comments: No recent GERD or emesis  BPH     Other findings: Seen by cardiology - stable dyspnea only with significant physical activity. Negative stress test 5/2018, normal EF.      Dental    (+) caps                       Anesthesia Plan  Planned anesthetic: general endotracheal  Fent/prop/jorge l  Decadron/zofran  ASA 3 - emergent     Anesthetic plan and risks discussed with: patient  Anesthesia plan special considerations: antiemetics,   Post-op plan: routine recovery      Results for orders placed or performed during the hospital encounter of 05/17/19   HM2(CBC w/o Differential)   Result Value Ref Range    WBC 7.8 4.0 - 11.0 thou/uL    RBC 4.41 4.40 - 6.20 mill/uL    Hemoglobin 14.4 14.0 - 18.0 g/dL    Hematocrit 42.3 40.0 - 54.0 %    MCV 96 80 - 100 fL    MCH 32.7 27.0 - 34.0 pg    MCHC 34.0 32.0 - 36.0 g/dL    RDW 13.5 11.0 - 14.5 %    Platelets 276 140 - 440 thou/uL    MPV 9.9 8.5 - 12.5 fL   Comprehensive Metabolic Panel   Result Value Ref Range    Sodium 137 136 - 145 mmol/L    Potassium 3.9 3.5 - 5.0 mmol/L    Chloride 104 98 - 107 mmol/L    CO2 18 (L) 22 - 31 mmol/L    Anion Gap, Calculation 15 5 - 18 mmol/L    Glucose 200 (H) 70 - 125 mg/dL    BUN 15 8 - 28 mg/dL    Creatinine 0.92 0.70 - 1.30 mg/dL    GFR MDRD Af Amer >60 >60 mL/min/1.73m2    GFR MDRD Non  Af Amer >60 >60 mL/min/1.73m2    Bilirubin, Total 0.4 0.0 - 1.0 mg/dL    Calcium 9.3 8.5 - 10.5 mg/dL    Protein, Total 6.7 6.0 - 8.0 g/dL    Albumin 3.4 (L) 3.5 - 5.0 g/dL    Alkaline Phosphatase 63 45 - 120 U/L    AST 23 0 - 40 U/L    ALT 16 0 - 45 U/L   POCT Glucose   Result Value Ref Range    Glucose 142 (H) 70 - 139 mg/dL   ECG 12 lead MUSE   Result Value Ref Range    SYSTOLIC BLOOD PRESSURE  mmHg    DIASTOLIC BLOOD PRESSURE  mmHg    VENTRICULAR RATE 70 BPM    ATRIAL RATE 70 BPM    P-R INTERVAL 222 ms    QRS DURATION 82 ms    Q-T INTERVAL 412 ms    QTC CALCULATION (BEZET) 444 ms    P Axis 66 degrees    R AXIS 5 degrees    T AXIS 26 degrees    MUSE DIAGNOSIS       Sinus rhythm with 1st degree A-V block  Inferior infarct (cited on or before 24-JAN-2012)  Abnormal ECG  When compared with ECG of 26-JAN-2012 11:00,  No significant change was found

## 2021-05-28 NOTE — ANESTHESIA POSTPROCEDURE EVALUATION
Patient: Vipul Yuan  APPENDECTOMY, LAPAROSCOPIC  Anesthesia type: general    Patient location: PACU  Last vitals:   Vitals Value Taken Time   /72 5/18/2019  2:45 PM   Temp  5/18/2019  2:51 PM   Pulse 58 5/18/2019  2:50 PM   Resp 21 5/18/2019  2:50 PM   SpO2 99 % 5/18/2019  2:50 PM   Vitals shown include unvalidated device data.  Post vital signs: stable  Level of consciousness: awake and responds to simple questions  Post-anesthesia pain: pain controlled  Post-anesthesia nausea and vomiting: no  Pulmonary: unassisted, spontaneous ventilation, face mask  Cardiovascular: stable and blood pressure at baseline  Hydration: adequate  Anesthetic events: no    QCDR Measures:  ASA# 11 - [place]-op Cardiac Arrest: ASA11B - Patient did NOT experience unanticipated cardiac arrest  ASA# 12 - [place]-op Mortality Rate: ASA12B - Patient did NOT die  ASA# 13 - PACU Re-Intubation Rate: ASA13B - Patient did NOT require a new airway mgmt  ASA# 10 - Composite Anes Safety: ASA10A - No serious adverse event    Additional Notes: Tolerated anesthetic well. ABDULLAHI orders written.

## 2021-05-28 NOTE — TELEPHONE ENCOUNTER
From: Marjorie Galvez MD  Sent: 5/17/2019   2:42 PM  To: Leilani Rodrigues, DRE  Patient had normal stress test in 2017 as well as 2018.  There is a good chance I will need to cancel next week's appointment due to family emergency.  Given this, if his chest discomfort is severe enough he should go to the emergency department, however one would go ahead and schedule exercise nuclear now?  See if we get this done early next week.  LF    -Called and LM for pt to call back to discuss recommendations. Order placed.

## 2021-05-29 NOTE — TELEPHONE ENCOUNTER
From: Marjorie Galvez MD  Sent: 5/20/2019   8:33 PM  To: Lyn Nicolas RN  Yes, a month, but if cp bad then ed.  LF

## 2021-05-29 NOTE — PROGRESS NOTES
[facility] Follow-up Note    Assessment & Plan        1. Coronary artery disease due to lipid rich plaque  -January 23 of 2012 he underwent angiography. Left main was normal and total occlusion mid LAD with only mild disease of the circumflex and right coronary artery. He received 2 drug coated stents at that period of time. He is had a vague chest discomfort which sounded more musculoskeletal from possibly spinal stenosis and repeated nuclear stress test which was normal in August 2018 and in addition rest test August 2017 was normal.  Given his increased shortness of breath which could be COPD or perform stress test again with exercise and if ischemia angiography.     2. Type 2 diabetes mellitus without complication, without long-term current use of insulin (H) -hemoglobin A1c 8.8 and I think we can do better and would defer to primary to up his medications.   3. Essential hypertension -under good control on 3 separate antihypertensives including Norvasc valsartan/HCTZ and metoprolol.   4. Dyslipidemia, goal LDL below 70 -cholesterol excellent at 159 with an LDL of 69 from April of this year.     Plan  1.  Primary to address increased hemoglobin A1c.  2.  Exercise nuclear stress test and angiography abnormal.  3.  Follow-up with me one year or sooner if needed.    Subjective  CC: 79-year-old white gentleman being seen in yearly follow-up today.  Over the last use developed increased shortness of breath and minimal activity.  He has had his appendix removed and since then has had an abdominal bloating and discomfort involving his abdomen.  There is no significant angina, palpitations, PND, orthopnea or peripheral edema.    Medications  Current Outpatient Medications   Medication Sig     amLODIPine (NORVASC) 5 MG tablet Take 1 tablet (5 mg total) by mouth daily. (Patient taking differently: Take 10 mg by mouth daily.       )     aspirin 81 MG EC tablet Take 1 tablet (81 mg total) by mouth daily.  "    atorvastatin (LIPITOR) 40 MG tablet Take 1 tablet (40 mg total) by mouth at bedtime.     metFORMIN (GLUCOPHAGE-XR) 500 MG 24 hr tablet Take 1 tablet (500 mg total) by mouth daily with breakfast.     metoprolol succinate (TOPROL-XL) 25 MG Take 1 tablet (25 mg total) by mouth daily. (Patient taking differently: Take 50 mg by mouth daily.       )     NON FORMULARY Take 1 tablet by mouth daily. Gout prevention supplement (likely tart cherry)     OXYGEN-AIR DELIVERY SYSTEMS MISC 2 L/min into each nostril continuous as needed (shortness of breath). Indications: ARF/lung contusion     valsartan-hydrochlorothiazide (DIOVAN-HCT) 160-25 mg per tablet Take 1 tablet by mouth daily.     UNABLE TO FIND Take 2 tablets by mouth every morning. Med Name: So ALVARENGAI (Standard Process Vitamins)       Objective  /60 (Patient Site: Left Arm, Patient Position: Sitting, Cuff Size: Adult Regular)   Pulse 84   Resp 20   Ht 5' 8\" (1.727 m)   Wt 185 lb 1.6 oz (84 kg)   BMI 28.14 kg/m      General Appearance:    Alert, cooperative, no distress, appears stated age   Head:    Normocephalic, without obvious abnormality, atraumatic   Throat:   Lips, mucosa, and tongue normal; teeth and gums normal   Neck:   Supple, symmetrical, trachea midline, no adenopathy;        thyroid:  No enlargement/tenderness/nodules; no carotid    bruit or JVD   Back:     Symmetric, no curvature, ROM normal, no CVA tenderness   Lungs:     Clear to auscultation bilaterally, respirations unlabored   Chest wall:    No tenderness or deformity   Heart:    Regular rate and rhythm, S1 and S2 normal, no murmur, rub   or gallop   Abdomen:     Soft, non-tender, bowel sounds active all four quadrants,     no masses, no organomegaly   Extremities:   Normal, atraumatic, no cyanosis or edema   Pulses:   2+ and symmetric all extremities   Skin:   Skin color, texture, turgor normal, no rashes or lesions     Results    Lab Results personally reviewed   Lab Results "   Component Value Date    CHOL 151 04/02/2019    CHOL 159 02/07/2017     Lab Results   Component Value Date    HDL 58 04/02/2019    HDL 55 02/07/2017     Lab Results   Component Value Date    LDLCALC 69 04/02/2019    LDLCALC 73 02/07/2017     Lab Results   Component Value Date    TRIG 121 04/02/2019    TRIG 154 (H) 02/07/2017     Lab Results   Component Value Date    WBC 7.8 05/17/2019    HGB 14.4 05/17/2019    HCT 42.3 05/17/2019     05/18/2019     Lab Results   Component Value Date    CREATININE 0.92 05/17/2019    BUN 15 05/17/2019     05/17/2019    K 3.9 05/17/2019    CO2 18 (L) 05/17/2019     Review of Systems:   General: Night Sweats  Eyes: WNL  Ears/Nose/Throat: WNL  Lungs: Shortness of Breath  Heart: Chest Pain, Shortness of Breath with activity  Stomach: WNL  Bladder: WNL  Muscle/Joints: Muscle Weakness  Skin: WNL  Nervous System: Loss of Balance  Mental Health: WNL     Blood: WNL

## 2021-05-29 NOTE — PATIENT INSTRUCTIONS - HE
Mr Vipul Yuan,  I enjoyed visiting with you again today.  I am glad to hear you are doing well.  Per our conversation let us get the stress test with exercise and nuclear images. We will address if available.  I will plan on seeing you 1 year or sooner if issues.  Travis Galvez

## 2021-05-31 VITALS — BODY MASS INDEX: 28.9 KG/M2 | HEIGHT: 68 IN | WEIGHT: 190.7 LBS

## 2021-05-31 VITALS — HEIGHT: 68 IN | BODY MASS INDEX: 27.74 KG/M2 | WEIGHT: 183 LBS

## 2021-06-01 VITALS — HEIGHT: 68 IN | BODY MASS INDEX: 28.34 KG/M2 | WEIGHT: 187 LBS

## 2021-06-02 VITALS — HEIGHT: 68 IN | BODY MASS INDEX: 28.05 KG/M2 | WEIGHT: 185.1 LBS

## 2021-06-02 VITALS — BODY MASS INDEX: 27.83 KG/M2 | WEIGHT: 183 LBS

## 2021-06-02 VITALS — BODY MASS INDEX: 27.37 KG/M2 | WEIGHT: 180 LBS

## 2021-06-02 VITALS — HEIGHT: 68 IN | BODY MASS INDEX: 28.7 KG/M2 | WEIGHT: 189.4 LBS

## 2021-06-04 VITALS
WEIGHT: 186.5 LBS | BODY MASS INDEX: 28.26 KG/M2 | SYSTOLIC BLOOD PRESSURE: 122 MMHG | HEIGHT: 68 IN | DIASTOLIC BLOOD PRESSURE: 70 MMHG | RESPIRATION RATE: 20 BRPM | HEART RATE: 80 BPM

## 2021-06-10 NOTE — TELEPHONE ENCOUNTER
===View-only below this line===  ----- Message -----  From: Marjorie Galvez MD  Sent: 8/27/2020   3:33 PM CDT  To: Lyn Nicolas RN    In general, please explain to patient very busy in clinic and warts lately.  Unless an emergency I would recommend that he can take Viagra, 5100 mg as needed, if he needs prescription I suggest he gets this from his primary.  My other concern is he does not take any nitroglycerin within 24 hours of this.  LF      Noted. Will inform patient that he can discuss further with [facility] at follow up visit in October and that rx will need to be obtained from PMD. LM for him to return call to get message. -misha

## 2021-06-10 NOTE — TELEPHONE ENCOUNTER
----- Message from Lexie Jeffries sent at 8/27/2020  1:48 PM CDT -----  General phone call:  PATIENT HAS A QUESTION ABOUT VIAGRA, PLEASE CALL  Caller: IZABEL  Primary cardiologist: DR JIMENEZ  Detailed reason for call: SEE ABOVE  New or active symptoms? NO  Best phone number: [phone]  Best time to contact: ANY TIME  Ok to leave a detailed message? YES  Device? NO    Additional Info:   FYI, PATIENT WANTED DR JIMENEZ'S PERSONAL NUMBER, AND HE WANTS DR JIMENEZ TO CALL HIM BACK.        Noted message from patient and his preference for [facility] to call him back to discuss. Pt was last seen in June 2019 and has a follow up with [facility] on October 13th, 2020.           Vipul Rojas called in regarding questions about Viagra and requests a call back from you. I can certainly call him and tell him to discuss further with you at follow up appt in October if you prefer.  Thanks,  Mal

## 2021-06-10 NOTE — TELEPHONE ENCOUNTER
Received a call back from Vipul. He was simply asking if his heart could take the Viagra. Relayed message from [facility] and patient will obtain an Rx from his PMD. He will further discuss at follow up visit with ROSE in October. -misha

## 2021-06-12 NOTE — ANESTHESIA PROCEDURE NOTES
Spinal Block    Patient location during procedure: OR  Start time: 8/16/2017 11:50 AM  End time: 8/16/2017 11:52 AM  Reason for block: primary anesthetic    Staffing:  Performing  Anesthesiologist: TRISTIN GARCÍA    Preanesthetic Checklist  Completed: patient identified, risks, benefits, and alternatives discussed, timeout performed, consent obtained, airway assessed, oxygen available, suction available, emergency drugs available and hand hygiene performed  Spinal Block  Patient position: sitting  Prep: ChloraPrep  Patient monitoring: heart rate, cardiac monitor, continuous pulse ox and blood pressure  Approach: midline  Location: L3-4  Injection technique: single-shot  Needle type: pencil-tip   Needle gauge: 24 G      Additional Notes:  Single pass. Clear CSF

## 2021-06-12 NOTE — PATIENT INSTRUCTIONS - HE
MR Vipul Yuan,  I enjoyed visiting with you again today.  I am glad to hear you are doing well.  Per our conversation we will get a stress test with exercise but hold the METOPROLOL the night before and the morning of.  I will plan on seeing you 1 year or sooner if needed.  Travis Galvez

## 2021-06-12 NOTE — ANESTHESIA PROCEDURE NOTES
Peripheral Block    Patient location during procedure: pre-op  Start time: 8/16/2017 11:04 AM  End time: 8/16/2017 11:26 AM  post-op analgesia per surgeon order as noted in medical record  Staffing:  Performing  Anesthesiologist: TRISTIN GARCÍA  Preanesthetic Checklist  Completed: patient identified, site marked, risks, benefits, and alternatives discussed, timeout performed, consent obtained, airway assessed, oxygen available, suction available, emergency drugs available and hand hygiene performed  Peripheral Block  Block type: saphenous, adductor canal block  Prep: ChloraPrep  Patient position: supine  Patient monitoring: continuous pulse oximetry, cardiac monitor, heart rate and blood pressure  Laterality: left  Injection technique: ultrasound guided    Ultrasound used to visualize needle placement in proximity to nerve being blocked: yes   Permanent ultrasound image captured for medical record    Needle  Needle type: Stimuplex   Needle gauge: 21 G  Needle length: 4 in  no peripheral nerve catheter placed  Assessment  Injection assessment: no difficulty with injection, negative aspiration for heme, no paresthesia on injection and incremental injection

## 2021-06-12 NOTE — ANESTHESIA PROCEDURE NOTES
Peripheral Block    Patient location during procedure: pre-op  Start time: 8/16/2017 11:00 AM  End time: 8/16/2017 11:03 AM  post-op analgesia per surgeon order as noted in medical record  Staffing:  Performing  Anesthesiologist: TRISTIN GARCÍA  Preanesthetic Checklist  Completed: patient identified, site marked, risks, benefits, and alternatives discussed, timeout performed, consent obtained, airway assessed, oxygen available, suction available, emergency drugs available and hand hygiene performed  Peripheral Block  Block type: other, tibial  Prep: ChloraPrep  Patient position: supine  Patient monitoring: cardiac monitor, continuous pulse oximetry, heart rate and blood pressure  Laterality: left  Injection technique: ultrasound guided    Ultrasound used to visualize needle placement in proximity to nerve being blocked: yes   Permanent ultrasound image captured for medical record    Needle  Needle type: Stimuplex   Needle gauge: 21 G  Needle length: 4 in  no peripheral nerve catheter placed  Assessment  Injection assessment: no difficulty with injection, negative aspiration for heme, no paresthesia on injection and incremental injection

## 2021-06-12 NOTE — TELEPHONE ENCOUNTER
----- Message from Rose Mary Fulton sent at 10/14/2020 12:35 PM CDT -----    Caller: Patient  Primary cardiologist: Dr. Galvez    Detailed reason for call: Patient was wondering why he was taken off of valsartan-hydrochlorothiazide (DIOVAN-HCT) 160-25 mg per tablet. Please advise    Best phone number: [phone]  Best time to contact: today  Ok to leave a detailed message? yes  Device? No        Called patient to address his concerns. Pt wondered why he was taking off the Valsartan-hydrochlorothiazide combination medication. Pt's medication list states he is on hydrochlorothiazide 25 mg twice a day and Telmisartan 40 mg two times a day. Pt was pretty confident he was still on the Valsartan combination medication. Called [facility] and spoke with the pharmacist. She states that Valsartan-hydrochlorothiazide has not been filled since April 2020 and the hydrochlorothiazide and Telmisartan were filled in September of this year but not by [facility]. Writer was able to see [facility] progress notes with Dr. Josue. He had adjusted his BP medications due to drug availability. Writer called back Vipul and went over this. He should be on hydrochlorothiazide 25 mg twice a day and Telmisartan 40 mg twice a day- no valsartan combination. Pt went through pill bottles and confirmed he was taking his medications correctly. He was confused by the S paperwork. Reassured patient. He also lost a hearing aid after his appointment downtown yesterday. Email was sent to clinic manager to see if anything had been turned in. Unfortunately, no hearing aid has been located. [place] was updated. [place] [place]

## 2021-06-12 NOTE — ANESTHESIA CARE TRANSFER NOTE
Last vitals:   Vitals:    08/16/17 1356   BP: 106/57   Pulse: 79   Resp: 18   Temp: 36.8  C (98.2  F)   SpO2: 93%     Patient's level of consciousness is drowsy  Spontaneous respirations: yes  Maintains airway independently: yes  Dentition unchanged: yes  Oropharynx: oropharynx clear of all foreign objects    QCDR Measures:  ASA# 20 - Surgical Safety Checklist: WHO surgical safety checklist completed prior to induction  PQRS# 430 - Adult PONV Prevention: 4558F - Pt received => 2 anti-emetic agents (different classes) preop & intraop  ASA# 8 - Peds PONV Prevention: NA - Not pediatric patient, not GA or 2 or more risk factors NOT present  PQRS# 424 - [place]-op Temp Management: 4559F - At least one body temp DOCUMENTED => 35.5C or 95.9F within required timeframe  PQRS# 426 - PACU Transfer Protocol: - Transfer of care checklist used  ASA# 14 - Acute Post-op Pain: ASA14B - Patient did NOT experience pain >= 7 out of 10

## 2021-06-12 NOTE — ANESTHESIA PREPROCEDURE EVALUATION
Anesthesia Evaluation      Patient summary reviewed   No history of anesthetic complications     Airway   Mallampati: II  Neck ROM: full   Pulmonary - negative ROS and normal exam    breath sounds clear to auscultation  (+) a smoker                         Cardiovascular - negative ROS and normal exam  Exercise tolerance: > or = 4 METS  (+) hypertension well controlled, past MI, CAD, CABG/stent, , hypercholesterolemia,     ECG reviewed  Rhythm: regular  Rate: normal,         Neuro/Psych - negative ROS     Endo/Other - negative ROS      GI/Hepatic/Renal - negative ROS           Dental - normal exam                        Anesthesia Plan  Planned anesthetic: spinal  Saphenous n. And Tibial n. Blocks for POA as ordered by Dr. Watson  ASA 3     Anesthetic plan and risks discussed with: patient    Post-op plan: routine recovery

## 2021-06-12 NOTE — ANESTHESIA POSTPROCEDURE EVALUATION
Patient: Vipul Yuan  LEFT TOTAL KNEE ARTHROPLASTY  Anesthesia type: spinal    Patient location: PACU  Last vitals:   Vitals:    08/16/17 1440   BP: 120/66   Pulse: 69   Resp: 15   Temp:    SpO2: 94%     Post vital signs: stable  Level of consciousness: awake and return to baseline  Post-anesthesia pain: pain controlled  Post-anesthesia nausea and vomiting: no  Pulmonary: unassisted, return to baseline  Cardiovascular: stable and blood pressure at baseline  Hydration: adequate  Anesthetic events: no, SAB resolving    QCDR Measures:  ASA# 11 - [place]-op Cardiac Arrest: ASA11B - Patient did NOT experience unanticipated cardiac arrest  ASA# 12 - [place]-op Mortality Rate: ASA12B - Patient did NOT die  ASA# 13 - PACU Re-Intubation Rate: NA - No ETT / LMA used for case  ASA# 10 - Composite Anes Safety: ASA10A - No serious adverse event  ASA# 38 - New Corneal Injury: ASA38A - No new exposure keratitis or corneal abrasion in PACU    Additional Notes:

## 2021-06-16 PROBLEM — E11.9 TYPE 2 DIABETES MELLITUS WITHOUT COMPLICATION, WITHOUT LONG-TERM CURRENT USE OF INSULIN (H): Status: ACTIVE | Noted: 2019-05-18

## 2021-06-16 PROBLEM — M17.11 PRIMARY OSTEOARTHRITIS OF RIGHT KNEE: Status: ACTIVE | Noted: 2018-02-21

## 2021-06-16 PROBLEM — K37 APPENDICITIS: Status: ACTIVE | Noted: 2019-05-17

## 2021-06-16 PROBLEM — K35.80 ACUTE APPENDICITIS: Status: ACTIVE | Noted: 2019-05-18

## 2021-06-16 PROBLEM — Z98.890 STATUS POST KNEE SURGERY: Status: ACTIVE | Noted: 2017-08-16

## 2021-06-16 NOTE — ANESTHESIA PROCEDURE NOTES
Spinal Block    Patient location during procedure: OR  Start time: 2/21/2018 7:11 AM  End time: 2/21/2018 7:14 AM  Reason for block: primary anesthetic    Staffing:  Performing  Anesthesiologist: DULCE MARIA MAYER    Preanesthetic Checklist  Completed: patient identified, risks, benefits, and alternatives discussed, timeout performed, consent obtained, at patient's request, airway assessed, oxygen available, suction available, emergency drugs available and hand hygiene performed  Spinal Block  Patient position: sitting  Prep: ChloraPrep and site prepped and draped  Patient monitoring: heart rate, cardiac monitor, continuous pulse ox and blood pressure  Approach: midline  Location: L3-4  Injection technique: single-shot  Needle type: pencil-tip   Needle gauge: 24 G

## 2021-06-16 NOTE — ANESTHESIA POSTPROCEDURE EVALUATION
Patient: Vipul Yuan  #1 RIGHT TOTAL KNEE ARTHROPLASTY  Anesthesia type: spinal    Patient location: PACU  Last vitals:   Vitals:    02/21/18 1015   BP: 109/57   Pulse: (!) 53   Resp: 15   Temp: 35.9  C (96.6  F)   SpO2: 94%     Post vital signs: stable  Level of consciousness: awake and responds to simple questions  Post-anesthesia pain: pain controlled  Post-anesthesia nausea and vomiting: no  Pulmonary: unassisted, return to baseline  Cardiovascular: stable and blood pressure at baseline  Hydration: adequate  Anesthetic events: no    QCDR Measures:  ASA# 11 - [place]-op Cardiac Arrest: ASA11B - Patient did NOT experience unanticipated cardiac arrest  ASA# 12 - [place]-op Mortality Rate: ASA12B - Patient did NOT die  ASA# 13 - PACU Re-Intubation Rate: NA - No ETT / LMA used for case  ASA# 10 - Composite Anes Safety: ASA10A - No serious adverse event    Additional Notes:

## 2021-06-16 NOTE — ANESTHESIA CARE TRANSFER NOTE
Last vitals:   Vitals:    02/21/18 0911   BP: 92/51   Pulse: 65   Resp: 20   Temp: 36.3  C (97.3  F)   SpO2: 96%     Patient's level of consciousness is drowsy  Spontaneous respirations: yes  Maintains airway independently: yes  Dentition unchanged: yes  Oropharynx: oropharynx clear of all foreign objects    QCDR Measures:  ASA# 20 - Surgical Safety Checklist: WHO surgical safety checklist completed prior to induction  PQRS# 430 - Adult PONV Prevention: 4558F - Pt received => 2 anti-emetic agents (different classes) preop & intraop  ASA# 8 - Peds PONV Prevention: NA - Not pediatric patient, not GA or 2 or more risk factors NOT present  PQRS# 424 - [place]-op Temp Management: 4559F - At least one body temp DOCUMENTED => 35.5C or 95.9F within required timeframe  PQRS# 426 - PACU Transfer Protocol: - Transfer of care checklist used  ASA# 14 - Acute Post-op Pain: ASA14B - Patient did NOT experience pain >= 7 out of 10

## 2021-06-16 NOTE — ANESTHESIA PREPROCEDURE EVALUATION
Anesthesia Evaluation      Patient summary reviewed   No history of anesthetic complications     Airway   Mallampati: II  Neck ROM: full   Pulmonary - negative ROS and normal exam    breath sounds clear to auscultation  (+) a smoker                         Cardiovascular - negative ROS  Exercise tolerance: > or = 4 METS  (+) hypertension, past MI, CAD, CABG/stent, , hypercholesterolemia,     (-) murmur  ECG reviewed  Rhythm: regular  Rate: normal,    no murmur      Neuro/Psych - negative ROS     Endo/Other    (+) diabetes mellitus well controlled,      GI/Hepatic/Renal - negative ROS           Dental - normal exam                        Anesthesia Plan  Planned anesthetic: spinal  Adductor canal block & tibial nerve block - R/B/A discussed, patient consented  Propofol drip  Ketamine 0.5mg/kg pre-incision  Decadron   Zofran  Possibility of GA/LMA/ETT discussed.    ASA 3     Anesthetic plan and risks discussed with: patient  Anesthesia plan special considerations: antiemetics,   Post-op plan: routine recovery

## 2021-06-16 NOTE — ANESTHESIA PROCEDURE NOTES
Peripheral Block    Patient location during procedure: pre-op  Start time: 2/21/2018 6:47 AM  End time: 2/21/2018 6:50 AM  post-op analgesia per surgeon order as noted in medical record  Staffing:  Performing  Anesthesiologist: DULCE MARIA MAYER  Preanesthetic Checklist  Completed: patient identified, site marked, risks, benefits, and alternatives discussed, timeout performed, consent obtained, at patient's request, airway assessed, oxygen available, suction available, emergency drugs available and hand hygiene performed  Peripheral Block  Block type: sciatic, tibial  Prep: ChloraPrep  Patient position: supine  Patient monitoring: cardiac monitor, continuous pulse oximetry, blood pressure and heart rate  Laterality: right  Injection technique: ultrasound guided    Ultrasound used to visualize needle placement in proximity to nerve being blocked: yes   Permanent ultrasound image captured for medical record  Sterile gel and probe cover used for ultrasound.    Needle  Needle type: Stimuplex   Needle gauge: 20G  Needle length: 4 in  no peripheral nerve catheter placed  Assessment  Injection assessment: no difficulty with injection, no paresthesia on injection, negative aspiration for heme and incremental injection

## 2021-06-17 NOTE — PROGRESS NOTES
[facility] Follow-up Note    Assessment & Plan        1. Coronary artery disease due to lipid rich plaque -January 23 of 2012 he underwent angiography. Left main was normal and total occlusion mid LAD with only mild disease of the circumflex and right coronary artery. He received 2 drug coated stents at that period of time. He is now having a vague chest discomfort which sounds more musculoskeletal from possibly spinal stenosis.  We will however repeat stress test although stress test last August 2017 was normal.     2. Other hyperlipidemia -cholesterol 159 with an LDL of 73 from February 2017 on atorvastatin 40 mg.  Given chest discomfort will have him stop the atorvastatin for a week to see if symptoms improve.   3. Essential hypertension -under good control currently on valsartan/HCTZ, tamsulosin, metoprolol, and amlodipine.  Could quite possibly be having bronchospasm from the metoprolol causing chest pain and will have him stop this for a week.   4.  Diabetes mellitus-defer to primary.  5.  Chest discomfort-he has bilateral arm discomfort as well and wondering if he has cervical spine strain.  Stress test as above off metoprolol, if ischemic angiography.  If not ischemic then if he is better off metoprolol do not use beta-blocker.  If chest pain no better restart beta-blocker and then hold atorvastatin.  If no better would then recommend evaluation by primary for cervical spine issues.    Plan  1.  Stress test and if abnormal angiography.  2.  Hold metoprolol for stress test and if chest pain better then discontinue beta-blocker.  3.  Chest pain no better off metoprolol and stress test normal we then have him hold atorvastatin.  4.  If chest pain no better off atorvastatin either within recommend cervical spine workup.  5.  Follow-up with me in 1 year or sooner if needed.    Subjective  CC: 78-year-old white gentleman being seen in a yearly follow-up today.  Since I seen him he has lost his  "wife, he has bilateral knee replacements.  He is now sleeping in a recliner due to right knee discomfort and he states when he lies in the recliner he sometimes gets arm discomfort bilaterally as well as chest discomfort.  He describes it as a fullness or ache sensation.  It goes away within about half an hour.  There is no chest discomfort of effort, palpitations, shortness breath, PND, orthopnea or peripheral edema.    Medications  Current Outpatient Prescriptions   Medication Sig     amLODIPine (NORVASC) 5 MG tablet Take 5 mg by mouth daily.     aspirin 325 MG tablet Take 1 tablet (325 mg total) by mouth 2 (two) times a day with meals.     atorvastatin (LIPITOR) 40 MG tablet Take 40 mg by mouth at bedtime.     docusate sodium (COLACE) 100 MG capsule Take 1 capsule (100 mg total) by mouth 2 (two) times a day as needed for constipation.     metFORMIN (GLUCOPHAGE-XR) 500 MG 24 hr tablet Take 500 mg by mouth daily with breakfast.     metoprolol succinate (TOPROL-XL) 25 MG Take 25 mg by mouth daily.     SAW PALMETTO ORAL Take 2 tablets by mouth 2 (two) times a day.     tamsulosin (FLOMAX) 0.4 mg Cp24 Take 0.4 mg by mouth 2 (two) times a day.      valsartan-hydrochlorothiazide (DIOVAN-HCT) 160-25 mg per tablet Take 1 tablet by mouth daily.      NON FORMULARY Take 2 tablets by mouth daily. Gout supplement       Objective  /60 (Patient Site: Left Arm, Patient Position: Sitting, Cuff Size: Adult Regular)  Pulse 83  Resp 16  Ht 5' 8\" (1.727 m)  Wt 187 lb (84.8 kg)  BMI 28.43 kg/m2    General Appearance:    Alert, cooperative, no distress, appears stated age   Head:    Normocephalic, without obvious abnormality, atraumatic   Throat:   Lips, mucosa, and tongue normal; teeth and gums normal   Neck:   Supple, symmetrical, trachea midline, no adenopathy;        thyroid:  No enlargement/tenderness/nodules; no carotid    bruit or JVD   Back:     Symmetric, no curvature, ROM normal, no CVA tenderness   Lungs:     Clear " to auscultation bilaterally, respirations unlabored   Chest wall:    No tenderness or deformity   Heart:    Regular rate and rhythm, S1 and S2 normal, no murmur, rub   or gallop   Abdomen:     Soft, non-tender, bowel sounds active all four quadrants,     no masses, no organomegaly   Extremities:   Normal, atraumatic, no cyanosis or edema   Pulses:   2+ and symmetric all extremities   Skin:   Skin color, texture, turgor normal, no rashes or lesions     Results    Lab Results personally reviewed   Lab Results   Component Value Date    CHOL 159 02/07/2017    CHOL 165 03/18/2016     Lab Results   Component Value Date    HDL 55 02/07/2017    HDL 47 03/18/2016     Lab Results   Component Value Date    LDLCALC 73 02/07/2017    LDLCALC 91 03/18/2016     Lab Results   Component Value Date    TRIG 154 (H) 02/07/2017    TRIG 134 03/18/2016     Lab Results   Component Value Date    WBC 14.0 (H) 02/22/2018    HGB 11.5 (L) 02/23/2018    HCT 31.2 (L) 02/22/2018     02/22/2018     Lab Results   Component Value Date    CREATININE 0.88 02/22/2018    BUN 17 02/22/2018     02/22/2018    K 4.2 02/22/2018    CO2 24 02/22/2018     Review of Systems:   General: WNL  Eyes: WNL  Ears/Nose/Throat: WNL  Lungs: WNL  Heart: Chest Pain, Arm Pain  Stomach: WNL  Bladder: WNL  Muscle/Joints: WNL  Skin: WNL  Nervous System: WNL  Mental Health: WNL     Blood: WNL

## 2021-06-21 NOTE — ANESTHESIA PROCEDURE NOTES
Epidural Block    Patient location during procedure: ICU  Time Called: 10/25/2018 2:30 PM  Reason for Block:post-op pain management  Staffing:  Performing  Anesthesiologist: GOLDY PARISI  Preanesthetic Checklist  Completed: patient identified, risks, benefits, and alternatives discussed, timeout performed, consent obtained, at patient's request, airway assessed, oxygen available, suction available, emergency drugs available and hand hygiene performed  Procedure  Patient position: sitting  Prep: ChloraPrep  Patient monitoring: continuous pulse oximetry, heart rate and blood pressure  Approach: midline  Epidural location: T 7-8.  Injection technique: RENNY saline  Number of Attempts:1  Needle  Needle type: Michelle   Needle gauge: 18 G     Catheter in Space: 5  Assessment  Sensory level:      Events: Hypotension responsive to fluid bolus and neosynephrine. D/W ICU MD and neosynephrine gtt ordered. One liter crystalloid given as a bolus.

## 2021-06-21 NOTE — ANESTHESIA POST-OP FOLLOW-UP NOTE
Patient: Vipul Yuan  * No procedures listed *  Anesthesia type: T- Epidural    Pt sitting in bed in NAD.  He has no complaints.  States his pain is well controlled, even with deep inspiration/cough  Currently his pump is alarming and stopped (infusion complete).    Will continue to follow for pain control and planon leaving settings as they are    Infusion to run at 10ml / hr  Report to RN that the pt's call lights not working and that the pump is alarming and Idle  Please call with ??'s    JS Edward

## 2021-06-21 NOTE — PROGRESS NOTES
Received intake call for home oxygen at 2:45pm. Reviewed patient's chart; Patient qualifies under Medicare guidelines and all documentation is in the chart except a signed order.  3:13pm- Spoke with respiratory therapist, Jordan, confirmed we received the order, and that they were going home today. She confirmed that the patient is going home today and I told her I just needed a signed order and then we can deliver to the port tank tot the hospital. She said she had already send a text page to the doctor, so we will wait.   3:22pm- Called to offer choice and patient is okay with [facility] setting him up, but he said he had no one to take him home today so he thinks he is spending another night in the hospital. But he understands that we will bring a tank to him at bedside for discharge and once he is home we will bring the remaining equipment. Told him I would call the nurses station to figure out what was going on and deliver a tank based on what they said. He understood.   3:28pm- Called the nurses station and spoke with nurse, Jyotsna Simpson. She confirmed that the Dr has discharged the patient, so he will be leaving today and if he doesn't have a ride they will provide him with a cab voucher. I told her I understand and will finish up the paperwork and deliver the tank within the next hour to the hospital. She understood.

## 2021-06-21 NOTE — ANESTHESIA PREPROCEDURE EVALUATION
Anesthesia Evaluation      Patient summary reviewed   No history of anesthetic complications     Airway   Mallampati: II  Neck ROM: full   Pulmonary    (+) shortness of breath, decreased breath sounds, a smoker    ROS comment: Closed fracture of multiple rib fxs risht side  Acute respiratory failure with atelectasis                         Cardiovascular   Exercise tolerance: > or = 4 METS  (+) hypertension, past MI, CAD, CABG/stent, , hypercholesterolemia,     (-) murmur  Rhythm: regular  Rate: normal,    no murmur   ROS comment:   The patient's exercise capacity is normal.    2.The exercise nuclear stress test is negative for inducible myocardial ischemia or infarction.    3.Ejection fraction preserved greater than 75%.    4.When compared to the images of 8/2/2017, there has been no significant change.         Neuro/Psych - negative ROS     Endo/Other    (+) diabetes mellitus, arthritis, obesity,      GI/Hepatic/Renal      Comments: BPH          Dental    (+) caps                       Anesthesia Plan  Planned anesthetic: epidural    ASA 3   Induction: intravenous   Anesthetic plan and risks discussed with: patient

## 2021-06-21 NOTE — ANESTHESIA POSTPROCEDURE EVALUATION
Patient reports minimal pain.     The epidural is infusing at  10 cc/hr.    The site is clear and  dry and the dressing is intact.    Will continue at  infusion rate of 10 cc/hr.    Plan to d/c epidural tomorrow with subsequent pain control by primary service.

## 2021-06-21 NOTE — ANESTHESIA POSTPROCEDURE EVALUATION
Patient: Vipul Yuan  * No procedures listed *  Anesthesia type: thoracic epidural for pain    Patient location: ICU  Last vitals:   Vitals:    10/28/18 1214   BP:    Pulse: (!) 101   Resp: 20   Temp:    SpO2: 92%     Pt states his pain is well controlled. Epidural site CDI. Sitting at bedside shaving independently. No complaints of pain. Epidural gtt bupiv 0.125% at 10 cc/hour. NP plans to begin heparin 5,000 u SQ q 12 hours. Plan to keep epidural 1 - 2 more days.

## 2021-06-21 NOTE — ANESTHESIA POSTPROCEDURE EVALUATION
Patient: Vipul Yuan  * No procedures listed *  Anesthesia type: pain epidural    Patient location: ICU  Last vitals:   Vitals:    10/26/18 1800   BP: 128/66   Pulse: 73   Resp: 21   Temp:    SpO2: 91%     Epidural gtt changed midday secondary to increased pain. Infusion now at 10 cc per hour with bupiv 0.125%. Pt now states he has very little pain and is satisfied with his pain management. Site CDI.

## 2021-06-21 NOTE — ANESTHESIA POSTPROCEDURE EVALUATION
Patient: Vipul Yuan  Thoracic epidural for pain control    Patient location: ICU  Last vitals:   Vitals:    10/26/18 0500   BP:    Pulse: 91   Resp: 22   Temp: 36.4  C (97.6  F)   SpO2:      Additional Notes:  The patient did very well overnight and rates his pain as 1-2/10. He feels much better since his admission. Epidural rate continuous at 6cc/hr. Plan is to continue epidural for pain control.

## 2021-06-29 NOTE — PROGRESS NOTES
Progress Notes by Marjorie Galvez MD at 10/13/2020  1:30 PM     Author: Marjorie Galvez MD Service: -- Author Type: Physician    Filed: 10/13/2020  2:03 PM Encounter Date: 10/13/2020 Status: Signed    : Marjorie Galvez MD (Physician)           [facility]  [facility] Follow-up Note    Assessment & Plan        1. Coronary artery disease due to lipid rich plaque -January 23, 2012 he underwent angiography. Left main was normal and total occlusion mid LAD with only mild disease of the circumflex and right coronary artery. He received 2 drug coated stents at that period of time. He is had a vague chest discomfort which sounded more musculoskeletal from possibly spinal stenosis and repeated nuclear stress test which was normal in August 2018 and in addition rest test August 2017 was normal.  Given his increased shortness of breath which could be COPD performed stress test again 2019 with exercise which was normal.  Given his increased shortness of breath as well as abdominal discomfort will perform exercise stress test with him holding metoprolol again this year.  If medium or large sized area of ischemia, will pursue angiography.    2. Essential hypertension -under good control on 4 agents including amlodipine, HCTZ, Toprol, as well as Micardis.   3. Dyslipidemia, goal LDL below 70 -lipids from earlier this year showed total cholesterol 169 with an LDL of 87.  If he has recurrent event would not hesitate to increase atorvastatin to 80 mg.   4. Type 2 diabetes mellitus without complication, without long-term current use of insulin (H) -hemoglobin A1c 7.4 and will consider him for one of our research trials.     Plan  1.  Exercise stress nuclear with him holding metoprolol for 2 doses, if medium or large sized area of ischemia angiography.  2.  Consider him for one of our research Angélica diabetes studies.  3.  Follow-up me in 1 year or sooner if needed.  4.  If he has recurrent event we will need  "to increase atorvastatin to 80 mg.    Subjective  CC: 80-year-old white gentleman being seen in yearly follow-up today.  He is busy at his place of in [place], working outside.  He feels well and has no major issues.  He still does complain of shortness of breath on heavy activity which he thinks is due to COPD/right lung injury, he also has a vague gas-like sensation in his chest and abdomen which moves around and goes away once he expels gas.  Patient complains of no syncope, dizziness, fatigue, fevers, chest pain, palpitations,  PND, orthopnea, nausea, vomiting, or edema.    Current Outpatient Medications   Medication Sig   ? amLODIPine (NORVASC) 5 MG tablet Take 1 tablet (5 mg total) by mouth daily. (Patient taking differently: Take 10 mg by mouth daily.       )   ? aspirin 81 MG EC tablet Take 1 tablet (81 mg total) by mouth daily.   ? atorvastatin (LIPITOR) 40 MG tablet Take 1 tablet (40 mg total) by mouth at bedtime.   ? hydroCHLOROthiazide (HYDRODIURIL) 25 MG tablet Take 25 mg by mouth 2 (two) times a day.   ? metFORMIN (GLUCOPHAGE-XR) 500 MG 24 hr tablet Take 1 tablet (500 mg total) by mouth daily with breakfast.   ? metoprolol succinate (TOPROL-XL) 25 MG Take 1 tablet (25 mg total) by mouth daily. (Patient taking differently: Take 25 mg by mouth 2 (two) times a day. )   ? NON FORMULARY Take 1 tablet by mouth daily. Gout prevention supplement (likely tart cherry)   ? OXYGEN-AIR DELIVERY SYSTEMS MISC 2 L/min into each nostril continuous as needed (shortness of breath). Indications: ARF/lung contusion   ? telmisartan (MICARDIS) 40 MG tablet Take 40 mg by mouth 2 (two) times a day.   ? UNABLE TO FIND Take 2 tablets by mouth every morning. Med Name: So CRUZ (Standard Process Vitamins)       Objective  /70 (Patient Site: Left Arm, Patient Position: Sitting, Cuff Size: Adult Regular)   Pulse 80   Resp 20   Ht 5' 8\" (1.727 m)   Wt 186 lb 8 oz (84.6 kg)   BMI 28.36 kg/m      General Appearance:    " Alert, cooperative, no distress, appears stated age   Head:    Normocephalic, without obvious abnormality, atraumatic   Throat:   Lips, mucosa, and tongue normal; teeth and gums normal   Neck:   Supple, symmetrical, trachea midline, no adenopathy;        thyroid:  No enlargement/tenderness/nodules; no carotid    bruit or JVD   Back:     Symmetric, no curvature, ROM normal, no CVA tenderness   Lungs:     Clear to auscultation bilaterally, respirations unlabored   Chest wall:    No tenderness or deformity   Heart:    Regular rate and rhythm, S1 and S2 normal, no murmur, rub   or gallop   Abdomen:     Soft, non-tender, bowel sounds active all four quadrants,     no masses, no organomegaly   Extremities:   Normal, atraumatic, no cyanosis or edema   Pulses:   2+ and symmetric all extremities   Skin:   Skin color, texture, turgor normal, no rashes or lesions     Results    Lab Results personally reviewed   Lab Results   Component Value Date    CHOL 169 03/09/2020    CHOL 151 04/02/2019     Lab Results   Component Value Date    HDL 66 03/09/2020    HDL 58 04/02/2019     Lab Results   Component Value Date    LDLCALC 87 03/09/2020    LDLCALC 69 04/02/2019     Lab Results   Component Value Date    TRIG 80 03/09/2020    TRIG 121 04/02/2019     Lab Results   Component Value Date    WBC 7.8 05/17/2019    HGB 14.4 05/17/2019    HCT 42.3 05/17/2019     05/18/2019     Lab Results   Component Value Date    CREATININE 0.90 06/19/2020    BUN 17 06/19/2020     06/19/2020    K 4.0 06/19/2020    CO2 28 06/19/2020     Review of Systems:   General: WNL  Eyes: WNL  Ears/Nose/Throat: WNL  Lungs: WNL  Heart: WNL  Stomach: WNL  Bladder: WNL  Muscle/Joints: WNL  Skin: WNL  Nervous System: WNL  Mental Health: WNL     Blood: WNL

## 2021-07-09 ENCOUNTER — RECORDS - HEALTHEAST (OUTPATIENT)
Dept: LAB | Facility: CLINIC | Age: 81
End: 2021-07-09

## 2021-07-09 LAB
ALBUMIN SERPL-MCNC: 3.9 G/DL (ref 3.5–5)
ALP SERPL-CCNC: 84 U/L (ref 45–120)
ALT SERPL W P-5'-P-CCNC: 26 U/L (ref 0–45)
ANION GAP SERPL CALCULATED.3IONS-SCNC: 16 MMOL/L (ref 5–18)
AST SERPL W P-5'-P-CCNC: 26 U/L (ref 0–40)
BILIRUB SERPL-MCNC: 0.7 MG/DL (ref 0–1)
BNP SERPL-MCNC: 33 PG/ML (ref 0–91)
BUN SERPL-MCNC: 25 MG/DL (ref 8–28)
CALCIUM SERPL-MCNC: 9.9 MG/DL (ref 8.5–10.5)
CHLORIDE BLD-SCNC: 101 MMOL/L (ref 98–107)
CO2 SERPL-SCNC: 26 MMOL/L (ref 22–31)
CREAT SERPL-MCNC: 1.17 MG/DL (ref 0.7–1.3)
GFR SERPL CREATININE-BSD FRML MDRD: 60 ML/MIN/1.73M2
GLUCOSE BLD-MCNC: 169 MG/DL (ref 70–125)
POTASSIUM BLD-SCNC: 3.6 MMOL/L (ref 3.5–5)
PROT SERPL-MCNC: 6.9 G/DL (ref 6–8)
SODIUM SERPL-SCNC: 143 MMOL/L (ref 136–145)

## 2021-07-23 ENCOUNTER — LAB REQUISITION (OUTPATIENT)
Dept: LAB | Facility: CLINIC | Age: 81
End: 2021-07-23

## 2021-07-23 DIAGNOSIS — R60.9 EDEMA, UNSPECIFIED: ICD-10-CM

## 2021-07-23 LAB — TSH SERPL DL<=0.005 MIU/L-ACNC: 1.14 UIU/ML (ref 0.3–5)

## 2021-07-23 PROCEDURE — 84443 ASSAY THYROID STIM HORMONE: CPT | Performed by: FAMILY MEDICINE

## 2025-08-27 ENCOUNTER — LAB REQUISITION (OUTPATIENT)
Dept: LAB | Facility: CLINIC | Age: 85
End: 2025-08-27

## 2025-08-27 DIAGNOSIS — E11.65 TYPE 2 DIABETES MELLITUS WITH HYPERGLYCEMIA (H): ICD-10-CM

## 2025-08-28 LAB
CREAT UR-MCNC: 84 MG/DL
MICROALBUMIN UR-MCNC: 58 MG/L
MICROALBUMIN/CREAT UR: 69.05 MG/G CR (ref 0–17)